# Patient Record
Sex: MALE
[De-identification: names, ages, dates, MRNs, and addresses within clinical notes are randomized per-mention and may not be internally consistent; named-entity substitution may affect disease eponyms.]

---

## 2022-08-26 ENCOUNTER — NURSE TRIAGE (OUTPATIENT)
Dept: OTHER | Facility: CLINIC | Age: 52
End: 2022-08-26

## 2022-08-26 NOTE — TELEPHONE ENCOUNTER
Subjective: Caller states \"Injured right ring finger\"     Current Symptoms: Injured right ring finger a couple months; Had full range of motion at the time;  Still having joint pain;  Pain increases with use;  Denies redness or swelling at this time; Onset: 2 months ago; sudden    Associated Symptoms:  decreased range of motion;    Pain Severity: 2/10; dull, aching; intermittent    Temperature: denies     What has been tried: denies    Recommended disposition: Toni He [id number] advice provided, patient verbalizes understanding; denies any other questions or concerns; instructed to call back for any new or worsening symptoms. Patient/caller agrees to follow-up with PCP     This triage is a result of a call to [address] [place] [address]. Please do not respond to the triage nurse through this encounter. Any subsequent communication should be directly with the patient.     Reason for Disposition   Followed an injury   Minor finger injury    Protocols used: Finger Injury-ADULT-[place], Finger Pain-ADULT-[place]